# Patient Record
(demographics unavailable — no encounter records)

---

## 2025-02-07 NOTE — REVIEW OF SYSTEMS
[Fever] : no fever [Chills] : no chills [Vision Problems] : no vision problems [Hoarseness] : no hoarseness [Nasal Discharge] : no nasal discharge [Sore Throat] : sore throat [Chest Pain] : no chest pain [Shortness Of Breath] : no shortness of breath [Abdominal Pain] : no abdominal pain [Vomiting] : no vomiting [Skin Rash] : no skin rash

## 2025-02-07 NOTE — HISTORY OF PRESENT ILLNESS
[Home] : at home, [unfilled] , at the time of the visit. [Other Location: e.g. Home (Enter Location, City,State)___] : at [unfilled] [Telehealth (audio & video)] : This visit was provided via telehealth using real-time 2-way audio visual technology. [Verbal consent obtained from patient] : the patient, [unfilled] [FreeTextEntry8] : 32-year-old female with no significant past medical history, on no daily medications, no medication allergies positive social alcohol and marijuana use complaining of "itchy" throat.  Patient reports taking her 5-year-old daughter to [facility] earlier today and was diagnosed with strep throat and subsequently started on amoxicillin.  Patient was told by pediatrician that she should be seen as well and started on antibiotics based on her symptoms.  Patient denies any associated fever, change in voice, difficulty swallowing, shortness of breath, chest pain or shortness of

## 2025-02-07 NOTE — ASSESSMENT
[FreeTextEntry1] : On virtual exam patient well-appearing in no acute distress speaking clearly airway patent no drooling Impression: Pharyngitis

## 2025-02-07 NOTE — PLAN
[FreeTextEntry1] : Rx amoxicillin 400 mg per 5 cc: 11 mL twice daily x 7 days Instructions and precautions reviewed